# Patient Record
Sex: MALE | Race: WHITE
[De-identification: names, ages, dates, MRNs, and addresses within clinical notes are randomized per-mention and may not be internally consistent; named-entity substitution may affect disease eponyms.]

---

## 2021-12-27 PROBLEM — Z00.00 ENCOUNTER FOR PREVENTIVE HEALTH EXAMINATION: Status: ACTIVE | Noted: 2021-12-27

## 2023-04-24 ENCOUNTER — APPOINTMENT (OUTPATIENT)
Dept: ORTHOPEDIC SURGERY | Facility: CLINIC | Age: 68
End: 2023-04-24
Payer: MEDICARE

## 2023-04-24 VITALS — BODY MASS INDEX: 24.11 KG/M2 | WEIGHT: 178 LBS | HEIGHT: 72 IN

## 2023-04-24 DIAGNOSIS — M19.029 PRIMARY OSTEOARTHRITIS, UNSPECIFIED ELBOW: ICD-10-CM

## 2023-04-24 PROCEDURE — 73080 X-RAY EXAM OF ELBOW: CPT | Mod: LT

## 2023-04-24 PROCEDURE — 99203 OFFICE O/P NEW LOW 30 MIN: CPT | Mod: 25

## 2023-04-24 PROCEDURE — 20605 DRAIN/INJ JOINT/BURSA W/O US: CPT

## 2023-04-24 NOTE — IMAGING
[de-identified] : left elbow- moderate sized olecranon bursal collection, no warmth or erythema. tolerates full painless range of motion. [FreeTextEntry1] : multiple small deg osteophytes noted

## 2023-04-24 NOTE — HISTORY OF PRESENT ILLNESS
[de-identified] : 4-24-23- Right hand dominant male with 4 week history of atraumatic swelling left olecranon bursa. No warmth or erythema or sign of infections [] : no [FreeTextEntry1] : left elbow  [FreeTextEntry5] : no injury, patient has developed a fluid fllled sac on his elbow.  [FreeTextEntry3] : 1 month

## 2023-04-24 NOTE — PROCEDURE
[Left] : of the left [Olecranon Bursae] : olecranon bursae [Pain] : pain [Inflammation] : inflammation [Alcohol] : alcohol [Betadine] : betadine [Ethyl Chloride sprayed topically] : ethyl chloride sprayed topically [Sterile technique used] : sterile technique used [Effusion] : effusion [] : Patient tolerated procedure well [Call if redness, pain or fever occur] : call if redness, pain or fever occur [Apply ice for 15min out of every hour for the next 12-24 hours as tolerated] : apply ice for 15 minutes out of every hour for the next 12-24 hours as tolerated [Patient was advised to rest the joint(s) for ____ days] : patient was advised to rest the joint(s) for [unfilled] days [Previous OTC use and PT nontherapeutic] : patient has tried OTC's including aspirin, Ibuprofen, Aleve, etc or prescription NSAIDS, and/or exercises at home and/or physical therapy without satisfactory response [Patient had decreased mobility in the joint] : patient had decreased mobility in the joint [Risks, benefits, alternatives discussed / Verbal consent obtained] : the risks benefits, and alternatives have been discussed, and verbal consent was obtained

## 2023-04-24 NOTE — ASSESSMENT
[FreeTextEntry1] : will aspirate and wrap with compressive dressing, ice and nsaids \par warned fluid may return, may need serial aspirations

## 2023-05-04 ENCOUNTER — APPOINTMENT (OUTPATIENT)
Dept: ORTHOPEDIC SURGERY | Facility: CLINIC | Age: 68
End: 2023-05-04
Payer: MEDICARE

## 2023-05-04 VITALS — HEIGHT: 72 IN | WEIGHT: 178 LBS | BODY MASS INDEX: 24.11 KG/M2

## 2023-05-04 PROCEDURE — 99213 OFFICE O/P EST LOW 20 MIN: CPT | Mod: 25

## 2023-05-04 PROCEDURE — 20605 DRAIN/INJ JOINT/BURSA W/O US: CPT

## 2023-05-04 NOTE — HISTORY OF PRESENT ILLNESS
[de-identified] : 4-24-23- Right hand dominant male with 4 week history of atraumatic swelling left olecranon bursa. No warmth or erythema or sign of infections [] : no [FreeTextEntry1] : left elbow  [FreeTextEntry3] : 1 month  [FreeTextEntry5] : no injury, patient has developed a fluid fllled sac on his elbow.

## 2023-05-11 ENCOUNTER — APPOINTMENT (OUTPATIENT)
Dept: ORTHOPEDIC SURGERY | Facility: CLINIC | Age: 68
End: 2023-05-11
Payer: MEDICARE

## 2023-05-11 VITALS — BODY MASS INDEX: 24.11 KG/M2 | WEIGHT: 178 LBS | HEIGHT: 72 IN

## 2023-05-11 PROCEDURE — 20605 DRAIN/INJ JOINT/BURSA W/O US: CPT | Mod: LT

## 2023-05-11 NOTE — HISTORY OF PRESENT ILLNESS
[de-identified] : 5-11-23- swelling has returned \par \par 4-24-23- Right hand dominant male with 4 week history of atraumatic swelling left olecranon bursa. No warmth or erythema or sign of infections [] : no [FreeTextEntry3] : 1 month  [FreeTextEntry1] : left elbow  [FreeTextEntry5] : no injury, patient has developed a fluid fllled sac on his elbow.

## 2023-05-11 NOTE — ASSESSMENT
[FreeTextEntry1] : will aspirate and this time administer cortisone and wrap with compressive dressing, ice and nsaids \par warned fluid may return, may need serial aspirations

## 2023-05-11 NOTE — PROCEDURE
[Left] : of the left [Olecranon Bursae] : olecranon bursae [Pain] : pain [Inflammation] : inflammation [Alcohol] : alcohol [Betadine] : betadine [Ethyl Chloride sprayed topically] : ethyl chloride sprayed topically [Sterile technique used] : sterile technique used [___ cc    6mg] :  Betamethasone (Celestone) ~Vcc of 6mg [Effusion] : effusion [] : Patient tolerated procedure well [Call if redness, pain or fever occur] : call if redness, pain or fever occur [Apply ice for 15min out of every hour for the next 12-24 hours as tolerated] : apply ice for 15 minutes out of every hour for the next 12-24 hours as tolerated [Patient was advised to rest the joint(s) for ____ days] : patient was advised to rest the joint(s) for [unfilled] days [Previous OTC use and PT nontherapeutic] : patient has tried OTC's including aspirin, Ibuprofen, Aleve, etc or prescription NSAIDS, and/or exercises at home and/or physical therapy without satisfactory response [Patient had decreased mobility in the joint] : patient had decreased mobility in the joint [Risks, benefits, alternatives discussed / Verbal consent obtained] : the risks benefits, and alternatives have been discussed, and verbal consent was obtained [Medium Joint Injection] : medium joint injection [de-identified] : 15cc blood tinged synovial

## 2023-05-11 NOTE — IMAGING
[de-identified] : left elbow- moderate sized olecranon bursal collection, no warmth or erythema. tolerates full painless range of motion. [FreeTextEntry1] : multiple small deg osteophytes noted

## 2023-06-01 ENCOUNTER — APPOINTMENT (OUTPATIENT)
Dept: ORTHOPEDIC SURGERY | Facility: CLINIC | Age: 68
End: 2023-06-01

## 2023-06-13 ENCOUNTER — APPOINTMENT (OUTPATIENT)
Dept: ORTHOPEDIC SURGERY | Facility: CLINIC | Age: 68
End: 2023-06-13
Payer: MEDICARE

## 2023-06-13 VITALS — HEIGHT: 72 IN | WEIGHT: 178 LBS | BODY MASS INDEX: 24.11 KG/M2

## 2023-06-13 PROCEDURE — 20605 DRAIN/INJ JOINT/BURSA W/O US: CPT | Mod: LT

## 2023-06-13 PROCEDURE — 99213 OFFICE O/P EST LOW 20 MIN: CPT | Mod: 25

## 2023-06-13 RX ORDER — AMOXICILLIN AND CLAVULANATE POTASSIUM 875; 125 MG/1; MG/1
875-125 TABLET, COATED ORAL TWICE DAILY
Qty: 20 | Refills: 0 | Status: ACTIVE | COMMUNITY
Start: 2023-06-13 | End: 1900-01-01

## 2023-06-13 NOTE — PROCEDURE
[Medium Joint Injection] : medium joint injection [Left] : of the left [Olecranon Bursae] : olecranon bursae [Pain] : pain [Inflammation] : inflammation [Alcohol] : alcohol [Betadine] : betadine [Ethyl Chloride sprayed topically] : ethyl chloride sprayed topically [Sterile technique used] : sterile technique used [Effusion] : effusion [] : Patient tolerated procedure well [Call if redness, pain or fever occur] : call if redness, pain or fever occur [Apply ice for 15min out of every hour for the next 12-24 hours as tolerated] : apply ice for 15 minutes out of every hour for the next 12-24 hours as tolerated [Patient was advised to rest the joint(s) for ____ days] : patient was advised to rest the joint(s) for [unfilled] days [Previous OTC use and PT nontherapeutic] : patient has tried OTC's including aspirin, Ibuprofen, Aleve, etc or prescription NSAIDS, and/or exercises at home and/or physical therapy without satisfactory response [Patient had decreased mobility in the joint] : patient had decreased mobility in the joint [Risks, benefits, alternatives discussed / Verbal consent obtained] : the risks benefits, and alternatives have been discussed, and verbal consent was obtained [de-identified] : 8cc blood tinged synovial

## 2023-06-13 NOTE — IMAGING
[de-identified] : This is his 4th aspiration left elbow- moderate sized olecranon bursal collection, +  inflammation, appears superficial, mild warmth to palpation, no erythema. full painless range of motion.\par Augmentin 875 bid \par warm compresses as directed\par monitor for increased erythema, swelling, RF for infection\par return one week with Dr. Weaver\par  [FreeTextEntry1] : multiple small deg osteophytes noted

## 2023-06-13 NOTE — ASSESSMENT
[FreeTextEntry1] : will aspirate and this time administer cortisone and wrap with compressive dressing, ice and nsaids \par warned fluid may return, may need serial aspirations\par Has superficial infection aside from bursa swelling. Will start on Augmentin and have him see Dr. Weaver next week

## 2023-06-13 NOTE — HISTORY OF PRESENT ILLNESS
[de-identified] : 6/13/23-  recurring swelling olecranon bursa, some pain. Temporary relief with csi last visit.  No f/c.\par \par 5-11-23- swelling has returned \par \par 4-24-23- Right hand dominant male with 4 week history of atraumatic swelling left olecranon bursa. No warmth or erythema or sign of infections [] : no [FreeTextEntry1] : left elbow  [FreeTextEntry3] : 1 month  [FreeTextEntry5] : no injury, patient has developed a fluid fllled sac on his elbow.

## 2023-06-21 ENCOUNTER — APPOINTMENT (OUTPATIENT)
Dept: ORTHOPEDIC SURGERY | Facility: CLINIC | Age: 68
End: 2023-06-21
Payer: MEDICARE

## 2023-06-21 VITALS — HEIGHT: 72 IN | BODY MASS INDEX: 24.11 KG/M2 | WEIGHT: 178 LBS

## 2023-06-21 DIAGNOSIS — L03.114 CELLULITIS OF LEFT UPPER LIMB: ICD-10-CM

## 2023-06-21 DIAGNOSIS — M70.22 OLECRANON BURSITIS, LEFT ELBOW: ICD-10-CM

## 2023-06-21 PROCEDURE — 99213 OFFICE O/P EST LOW 20 MIN: CPT | Mod: 57

## 2023-06-21 RX ORDER — AMOXICILLIN AND CLAVULANATE POTASSIUM 875; 125 MG/1; MG/1
875-125 TABLET, COATED ORAL
Qty: 20 | Refills: 0 | Status: ACTIVE | COMMUNITY
Start: 2023-06-21 | End: 1900-01-01

## 2023-06-21 NOTE — IMAGING
[de-identified] : Mild to moderate left olecranon bursitis with minimal erythema and no ttp noted.\par ROM is full with no ligamentous laxity noted. \par Strength is 5/5 in all planes.\par There is no ligamentous laxity noted.  [FreeTextEntry1] : multiple small deg osteophytes noted

## 2023-06-21 NOTE — HISTORY OF PRESENT ILLNESS
[de-identified] : 6/12/2023: pt here for f/u of an infected left olecranon bursitis.\par Mr. Linn is on his 9th day of a 10 day course of Augmentin 875 mg bid. \par Pt states his pain has resolved. \par pt has had 4 previous aspirations. \par \par 6/13/23-  recurring swelling olecranon bursa, some pain. Temporary relief with csi last visit.  No f/c.\par \par 5-11-23- swelling has returned \par \par 4-24-23- Right hand dominant male with 4 week history of atraumatic swelling left olecranon bursa. No warmth or erythema or sign of infections [] : no [FreeTextEntry1] : left elbow  [FreeTextEntry3] : 1 month  [FreeTextEntry5] : no injury, patient has developed a fluid fllled sac on his elbow.

## 2023-06-21 NOTE — ASSESSMENT
[FreeTextEntry1] : The patient was advised of the diagnosis. The natural history of the pathology was explained in full to the patient in layman's terms. All questions were answered. The risks and benefits of surgical and non-surgical treatment alternatives were explained in full to the patient.\par \par Risks including but not limited to infection, blood loss, tendon damage and delayed tendon disruption, muscle damage, nerve damage, stiffness, pain, arthritis, loss of function, potential for secondary surgery, loss of limb or life. The patient had the opportunity to ask questions and all questions were answered to their satisfaction.\par \par Recommend no further aspirations of the left olecranon bursa.\par Pt provided continuation of Augmentin 875 mg x 10 more days as he is leaving for [place] on vacation.\par Mr. Linn will be scheduled for surgical excision of the left olecranon bursa once he returns from vacation. \par

## 2023-06-21 NOTE — PHYSICAL EXAM
[Left] : left elbow [There are no fractures, subluxations or dislocations. No significant abnormalities are seen] : There are no fractures, subluxations or dislocations. No significant abnormalities are seen [FreeTextEntry1] : small olecranon enthesophyte noted.

## 2023-07-21 ENCOUNTER — APPOINTMENT (OUTPATIENT)
Age: 68
End: 2023-07-21

## 2023-08-02 ENCOUNTER — APPOINTMENT (OUTPATIENT)
Dept: ORTHOPEDIC SURGERY | Facility: CLINIC | Age: 68
End: 2023-08-02